# Patient Record
Sex: MALE | Race: WHITE | NOT HISPANIC OR LATINO
[De-identification: names, ages, dates, MRNs, and addresses within clinical notes are randomized per-mention and may not be internally consistent; named-entity substitution may affect disease eponyms.]

---

## 2021-01-09 ENCOUNTER — TRANSCRIPTION ENCOUNTER (OUTPATIENT)
Age: 76
End: 2021-01-09

## 2021-02-06 ENCOUNTER — TRANSCRIPTION ENCOUNTER (OUTPATIENT)
Age: 76
End: 2021-02-06

## 2022-02-11 ENCOUNTER — EMERGENCY (EMERGENCY)
Facility: HOSPITAL | Age: 77
LOS: 1 days | Discharge: HOME | End: 2022-02-11
Attending: EMERGENCY MEDICINE | Admitting: EMERGENCY MEDICINE
Payer: MEDICARE

## 2022-02-11 VITALS
WEIGHT: 175.05 LBS | DIASTOLIC BLOOD PRESSURE: 98 MMHG | TEMPERATURE: 98 F | SYSTOLIC BLOOD PRESSURE: 160 MMHG | HEIGHT: 70 IN | RESPIRATION RATE: 18 BRPM | HEART RATE: 65 BPM | OXYGEN SATURATION: 99 %

## 2022-02-11 DIAGNOSIS — R33.9 RETENTION OF URINE, UNSPECIFIED: ICD-10-CM

## 2022-02-11 DIAGNOSIS — M54.9 DORSALGIA, UNSPECIFIED: ICD-10-CM

## 2022-02-11 DIAGNOSIS — N40.1 BENIGN PROSTATIC HYPERPLASIA WITH LOWER URINARY TRACT SYMPTOMS: ICD-10-CM

## 2022-02-11 DIAGNOSIS — R33.8 OTHER RETENTION OF URINE: ICD-10-CM

## 2022-02-11 LAB
APPEARANCE UR: CLEAR — SIGNIFICANT CHANGE UP
BILIRUB UR-MCNC: NEGATIVE — SIGNIFICANT CHANGE UP
COLOR SPEC: YELLOW — SIGNIFICANT CHANGE UP
DIFF PNL FLD: NEGATIVE — SIGNIFICANT CHANGE UP
GLUCOSE UR QL: NEGATIVE — SIGNIFICANT CHANGE UP
KETONES UR-MCNC: NEGATIVE — SIGNIFICANT CHANGE UP
LEUKOCYTE ESTERASE UR-ACNC: NEGATIVE — SIGNIFICANT CHANGE UP
NITRITE UR-MCNC: NEGATIVE — SIGNIFICANT CHANGE UP
PH UR: 5.5 — SIGNIFICANT CHANGE UP (ref 5–8)
PROT UR-MCNC: NEGATIVE MG/DL — SIGNIFICANT CHANGE UP
SP GR SPEC: 1.02 — SIGNIFICANT CHANGE UP (ref 1–1.03)
UROBILINOGEN FLD QL: 0.2 E.U./DL — SIGNIFICANT CHANGE UP

## 2022-02-11 PROCEDURE — 99284 EMERGENCY DEPT VISIT MOD MDM: CPT

## 2022-02-11 NOTE — ED ADULT NURSE NOTE - NSIMPLEMENTINTERV_GEN_ALL_ED
Implemented All Universal Safety Interventions:  [place] to call system. Call bell, personal items and telephone within reach. Instruct patient to call for assistance. Room bathroom lighting operational. Non-slip footwear when patient is off stretcher. Physically safe environment: no spills, clutter or unnecessary equipment. Stretcher in lowest position, wheels locked, appropriate side rails in place.

## 2022-02-11 NOTE — ED ADULT TRIAGE NOTE - CHIEF COMPLAINT QUOTE
Pt c/o urinary retention, states that he has not been able to urinate since 12pm, also complaining of groin pain.

## 2022-02-11 NOTE — ED ADULT TRIAGE NOTE - NSTRIAGECARE_GEN_A_ER
Pt presents to er for c/o head laceration.  Pt states tripped and fell down one stair.  Pt reports hitting head with right forehead laceration.  Pt denies loc.     Face Mask

## 2022-02-12 VITALS
HEART RATE: 68 BPM | SYSTOLIC BLOOD PRESSURE: 150 MMHG | DIASTOLIC BLOOD PRESSURE: 88 MMHG | OXYGEN SATURATION: 98 % | TEMPERATURE: 98 F | RESPIRATION RATE: 16 BRPM

## 2022-02-12 LAB
ALBUMIN SERPL ELPH-MCNC: 4 G/DL — SIGNIFICANT CHANGE UP (ref 3.3–5)
ALP SERPL-CCNC: 32 U/L — LOW (ref 40–120)
ALT FLD-CCNC: 13 U/L — SIGNIFICANT CHANGE UP (ref 10–45)
ANION GAP SERPL CALC-SCNC: 12 MMOL/L — SIGNIFICANT CHANGE UP (ref 5–17)
AST SERPL-CCNC: 27 U/L — SIGNIFICANT CHANGE UP (ref 10–40)
BASOPHILS # BLD AUTO: 0.02 K/UL — SIGNIFICANT CHANGE UP (ref 0–0.2)
BASOPHILS NFR BLD AUTO: 0.3 % — SIGNIFICANT CHANGE UP (ref 0–2)
BILIRUB SERPL-MCNC: 0.3 MG/DL — SIGNIFICANT CHANGE UP (ref 0.2–1.2)
BUN SERPL-MCNC: 25 MG/DL — HIGH (ref 7–23)
CALCIUM SERPL-MCNC: 9.4 MG/DL — SIGNIFICANT CHANGE UP (ref 8.4–10.5)
CHLORIDE SERPL-SCNC: 107 MMOL/L — SIGNIFICANT CHANGE UP (ref 96–108)
CO2 SERPL-SCNC: 22 MMOL/L — SIGNIFICANT CHANGE UP (ref 22–31)
CREAT SERPL-MCNC: 1.95 MG/DL — HIGH (ref 0.5–1.3)
EOSINOPHIL # BLD AUTO: 0.1 K/UL — SIGNIFICANT CHANGE UP (ref 0–0.5)
EOSINOPHIL NFR BLD AUTO: 1.3 % — SIGNIFICANT CHANGE UP (ref 0–6)
GLUCOSE SERPL-MCNC: 104 MG/DL — HIGH (ref 70–99)
HCT VFR BLD CALC: 37.2 % — LOW (ref 39–50)
HGB BLD-MCNC: 12.3 G/DL — LOW (ref 13–17)
IMM GRANULOCYTES NFR BLD AUTO: 0.4 % — SIGNIFICANT CHANGE UP (ref 0–1.5)
LYMPHOCYTES # BLD AUTO: 0.87 K/UL — LOW (ref 1–3.3)
LYMPHOCYTES # BLD AUTO: 11.5 % — LOW (ref 13–44)
MCHC RBC-ENTMCNC: 32.2 PG — SIGNIFICANT CHANGE UP (ref 27–34)
MCHC RBC-ENTMCNC: 33.1 GM/DL — SIGNIFICANT CHANGE UP (ref 32–36)
MCV RBC AUTO: 97.4 FL — SIGNIFICANT CHANGE UP (ref 80–100)
MONOCYTES # BLD AUTO: 0.76 K/UL — SIGNIFICANT CHANGE UP (ref 0–0.9)
MONOCYTES NFR BLD AUTO: 10.1 % — SIGNIFICANT CHANGE UP (ref 2–14)
NEUTROPHILS # BLD AUTO: 5.76 K/UL — SIGNIFICANT CHANGE UP (ref 1.8–7.4)
NEUTROPHILS NFR BLD AUTO: 76.4 % — SIGNIFICANT CHANGE UP (ref 43–77)
NRBC # BLD: 0 /100 WBCS — SIGNIFICANT CHANGE UP (ref 0–0)
PLATELET # BLD AUTO: 223 K/UL — SIGNIFICANT CHANGE UP (ref 150–400)
POTASSIUM SERPL-MCNC: 4 MMOL/L — SIGNIFICANT CHANGE UP (ref 3.5–5.3)
POTASSIUM SERPL-SCNC: 4 MMOL/L — SIGNIFICANT CHANGE UP (ref 3.5–5.3)
PROT SERPL-MCNC: 6 G/DL — SIGNIFICANT CHANGE UP (ref 6–8.3)
RBC # BLD: 3.82 M/UL — LOW (ref 4.2–5.8)
RBC # FLD: 13.9 % — SIGNIFICANT CHANGE UP (ref 10.3–14.5)
SODIUM SERPL-SCNC: 141 MMOL/L — SIGNIFICANT CHANGE UP (ref 135–145)
WBC # BLD: 7.54 K/UL — SIGNIFICANT CHANGE UP (ref 3.8–10.5)
WBC # FLD AUTO: 7.54 K/UL — SIGNIFICANT CHANGE UP (ref 3.8–10.5)

## 2022-02-12 PROCEDURE — 85025 COMPLETE CBC W/AUTO DIFF WBC: CPT

## 2022-02-12 PROCEDURE — 80053 COMPREHEN METABOLIC PANEL: CPT

## 2022-02-12 PROCEDURE — 96360 HYDRATION IV INFUSION INIT: CPT

## 2022-02-12 PROCEDURE — 81003 URINALYSIS AUTO W/O SCOPE: CPT

## 2022-02-12 PROCEDURE — 36415 COLL VENOUS BLD VENIPUNCTURE: CPT

## 2022-02-12 PROCEDURE — 99283 EMERGENCY DEPT VISIT LOW MDM: CPT | Mod: 25

## 2022-02-12 RX ORDER — SODIUM CHLORIDE 9 MG/ML
1000 INJECTION INTRAMUSCULAR; INTRAVENOUS; SUBCUTANEOUS ONCE
Refills: 0 | Status: COMPLETED | OUTPATIENT
Start: 2022-02-12 | End: 2022-02-12

## 2022-02-12 RX ADMIN — SODIUM CHLORIDE 1000 MILLILITER(S): 9 INJECTION INTRAMUSCULAR; INTRAVENOUS; SUBCUTANEOUS at 01:40

## 2022-02-12 RX ADMIN — SODIUM CHLORIDE 1000 MILLILITER(S): 9 INJECTION INTRAMUSCULAR; INTRAVENOUS; SUBCUTANEOUS at 03:06

## 2022-02-12 NOTE — ED PROVIDER NOTE - OBJECTIVE STATEMENT
75 y/o M with a pmhx of BPH presents to the ED with a cc of urine retention since yesterday. Patient reports he was on vacation last week and noticed 3 days ago that he developed urinary frequency and urgency. Patient flew back to [facility] today and spoke with his PMD about symptoms and PMD sent a rx of Cifran. Patient took his first dose today. Patient is also c/o pressure and pain to suprapubic region, and pain in lower back. Patient has no hx of kidney stones, blood in urine or urinary retention in the past. Patient denies any fever, chills, diarrhea or constipation. 77 y/o M with a pmhx of BPH presents to the ED with a cc of urine retention since yesterday. Patient reports he was on vacation last week and noticed 3 days ago that he developed urinary frequency and urgency. Patient flew back to [facility] today and spoke with his PMD about symptoms and PMD sent a rx of Cipro. Patient took his first dose today. Patient is also c/o pressure and pain to suprapubic region, and pain in lower back. Patient has no hx of kidney stones, blood in urine or urinary retention in the past. Patient denies any fever, chills, diarrhea or constipation.

## 2022-02-12 NOTE — ED PROVIDER NOTE - NSFOLLOWUPINSTRUCTIONS_ED_ALL_ED_FT
Acute Urinary Retention, Male       Acute urinary retention is a condition in which a person is unable to pass urine or can only pass a little urine. This condition can happen suddenly and last for a short time. If left untreated, it can become long-term (chronic) and result in kidney damage or other serious complications.      What are the causes?    This condition may be caused by:  •Obstruction or narrowing of the tube that drains the bladder (urethra). This may be caused by surgery, problems with nearby organs, or injury to the bladder or urethra.      •Problems with the nerves in the bladder.      •Tumors in the area of the pelvis, bladder, or urethra.      •Certain medicines.      •Bladder or urinary tract infection.      •Constipation.        What increases the risk?    This condition is more likely to develop in older men. As men age, their prostate may become larger and may start to press or squeeze on the bladder or the urethra. Other chronic health conditions can increase the risk of acute urinary retention. These include:  •Diseases such as multiple sclerosis.      •Spinal cord injuries.      •Diabetes.      •Degenerative cognitive conditions, such as delirium or dementia.      •Psychological conditions. A man may hold his urine due to trauma or because he does not want to use the bathroom.        What are the signs or symptoms?    Symptoms of this condition include:  •Trouble urinating.      •Pain in the lower abdomen.        How is this diagnosed?    This condition is diagnosed based on a physical exam and your medical history. You may also have other tests, including:  •An ultrasound of the bladder or kidneys or both.      •Blood tests.      •A urine analysis.      •Additional tests may be needed, such as a CT scan, MRI, and kidney or bladder function tests.        How is this treated?    Treatment for this condition may include:  •Medicines.      •Placing a thin, sterile tube (catheter) into the bladder to drain urine out of the body. This is called an indwelling urinary catheter. After it is inserted, the catheter is held in place with a small balloon that is filled with sterile water. Urine drains from the catheter into a collection bag outside of the body.      •Behavioral therapy.      •Treatment for other conditions.      If needed, you may be treated in the hospital for kidney function problems or to manage other complications.      Follow these instructions at home:    Medicines     •Take over-the-counter and prescription medicines only as told by your health care provider. Avoid certain medicines, such as decongestants, antihistamines, and some prescription medicines. Do not take any medicine unless your health care provider approves.      •If you were prescribed an antibiotic medicine, take it as told by your health care provider. Do not stop using the antibiotic even if you start to feel better.      General instructions     • Do not use any products that contain nicotine or tobacco. These products include cigarettes, chewing tobacco, and vaping devices, such as e-cigarettes. If you need help quitting, ask your health care provider.      •Drink enough fluid to keep your urine pale yellow.      •If you have an indwelling urinary catheter, follow the instructions from your health care provider.      •Monitor any changes in your symptoms. Tell your health care provider about any changes.      •If instructed, monitor your blood pressure at home. Report changes as told by your health care provider.      •Keep all follow-up visits. This is important.        Contact a health care provider if:    •You have uncomfortable bladder contractions that you cannot control (spasms).      •You leak urine with the spasms.        Get help right away if:    •You have chills or a fever.      •You have blood in your urine.    •You have a catheter and the following happens:  •Your catheter stops draining urine.      •Your catheter falls out.          Summary    •Acute urinary retention is a condition in which a person is unable to pass urine or can only pass a little urine. If left untreated, this condition can result in kidney damage or other serious complications.      •An enlarged prostate may cause this condition. As men age, their prostate gland may become larger and may press or squeeze on the bladder or the urethra.      •Treatment for this condition may include medicines and placement of an indwelling urinary catheter.      •Monitor any changes in your symptoms. Tell your health care provider about any changes.      This information is not intended to replace advice given to you by your health care provider. Make sure you discuss any questions you have with your health care provider.                       MITCHELL CATHETER PLACEMENT AND CARE - Discharge Care           Mitchell Catheter Placement and Care    WHAT YOU NEED TO KNOW:    A Mitchell catheter is a sterile tube that is inserted into your bladder to drain urine. It is also called an indwelling urinary catheter.     DISCHARGE INSTRUCTIONS:    Seek care immediately if:   •Your catheter comes out.       •You suddenly have material that looks like sand in the tubing or drainage bag.      •No urine is draining into the bag and you have checked the system.      •You have pain in your hip, back, pelvis, or lower abdomen.      •You are confused or cannot think clearly.      Call your doctor or urologist if:   •You have a fever.      •You have bladder spasms for more than 1 day after the catheter is placed.      •You see blood in the tubing or drainage bag.      •You have a rash or itching where the catheter tube is secured to your skin.      •Urine leaks from or around the catheter, tubing, or drainage bag.      •The closed drainage system has accidently come open or apart.       •You see a layer of crystals inside the tubing.      •You have questions or concerns about your condition or care.      Care for your catheter and drainage bag: You can reduce your risk for infection and injury by caring for your catheter and drainage bag properly.  •Wash your hands often. Wash before and after you touch your catheter, tubing, or drainage bag. Use soap and water. Wear clean disposable gloves when you care for your catheter or disconnect the drainage bag. Wash your hands before you prepare or eat food.   Handwashing           •Clean your genital area 2 times every day. Clean your catheter area and anal opening after every bowel movement. ?For men: Use a soapy cloth to clean the tip of your penis. Start where the catheter enters. Wipe backward making sure to pull back the foreskin. Then use a cloth with clear water in the same direction to clean away the soap.       ?For women: Use a soapy cloth to clean the area that the catheter enters your body. Make sure to separate your labia and wipe toward the anus. Then use a cloth with clear water and wipe in the same direction.       •Secure the catheter tube so you do not pull or move the catheter. This helps prevent pain and bladder spasms. Healthcare providers will show you how to use medical tape or a strap to secure the catheter tube to your body.       •Keep a closed drainage system. Your catheter should always be attached to the drainage bag to form a closed system. Do not disconnect any part of the closed system unless you need to change the bag.      •Keep the drainage bag below the level of your waist. This helps stop urine from moving back up the tubing and into your bladder. Do not loop or kink the tubing. This can cause urine to back up and collect in your bladder. Do not let the drainage bag touch or lie on the floor.      •Empty the drainage bag when needed. The weight of a full drainage bag can be painful. Empty the drainage bag every 3 to 6 hours or when it is ? full.       •Clean and change the drainage bag as directed. Ask your healthcare provider how often you should change the drainage bag and what cleaning solution to use. Wear disposable gloves when you change the bag. Do not allow the end of the catheter or tubing to touch anything. Clean the ends with an alcohol pad before you reconnect them.      What to do if problems develop:   •No urine is draining into the bag: ?Check for kinks in the tubing and straighten them out.       ?Check the tape or strap used to secure the catheter tube to your skin. Make sure it is not blocking the tube.       ?Make sure you are not sitting or lying on the tubing.      ?Make sure the urine bag is hanging below the level of your waist.      •Urine leaks from or around the catheter, tubing, or drainage bag: Check if the closed drainage system has accidently come open or apart. Clean the catheter and tubing ends with a new alcohol pad and reconnect them.       Follow up with your doctor or urologist as directed: Write down your questions so you remember to ask them during your visits.

## 2022-02-12 NOTE — ED PROVIDER NOTE - CLINICAL SUMMARY MEDICAL DECISION MAKING FREE TEXT BOX
75 y/o M with urinary retention x1 day, as well as urinary frequency and urgency for the past x3 days. Patient is afebrile and rest of his exam is unremarkable. Will put Echols catheter, check basic labs, and call patients urologist.

## 2022-02-12 NOTE — ED PROVIDER NOTE - PATIENT PORTAL LINK FT
You can access the FollowMyHealth Patient Portal offered by [facility] by registering at the following website: [email or web address]. By joining [facility]’s FollowMyHealth portal, you will also be able to view your health information using other applications (apps) compatible with our system.

## 2022-02-12 NOTE — ED PROVIDER NOTE - CARE PROVIDER_API CALL
Joel Farias)  Urology  [address], [facility], [place] [address]  Phone: [phone]  Fax: [phone]  Follow Up Time:

## 2022-02-14 DIAGNOSIS — N49.1 INFLAMMATORY DISORDERS OF SPERMATIC CORD, TUNICA VAGINALIS AND VAS DEFERENS: ICD-10-CM

## 2022-02-14 PROBLEM — Z00.00 ENCOUNTER FOR PREVENTIVE HEALTH EXAMINATION: Status: ACTIVE | Noted: 2022-02-14

## 2022-02-14 PROBLEM — N40.0 BENIGN PROSTATIC HYPERPLASIA WITHOUT LOWER URINARY TRACT SYMPTOMS: Chronic | Status: ACTIVE | Noted: 2022-02-12

## 2022-02-15 ENCOUNTER — NON-APPOINTMENT (OUTPATIENT)
Age: 77
End: 2022-02-15

## 2022-02-16 ENCOUNTER — APPOINTMENT (OUTPATIENT)
Dept: UROLOGY | Facility: CLINIC | Age: 77
End: 2022-02-16
Payer: MEDICARE

## 2022-02-16 VITALS
HEART RATE: 61 BPM | TEMPERATURE: 98 F | BODY MASS INDEX: 25.05 KG/M2 | DIASTOLIC BLOOD PRESSURE: 80 MMHG | WEIGHT: 175 LBS | HEIGHT: 70 IN | SYSTOLIC BLOOD PRESSURE: 160 MMHG

## 2022-02-16 PROCEDURE — 51798 US URINE CAPACITY MEASURE: CPT

## 2022-02-16 PROCEDURE — 51702 INSERT TEMP BLADDER CATH: CPT

## 2022-02-16 PROCEDURE — 99204 OFFICE O/P NEW MOD 45 MIN: CPT | Mod: 25

## 2022-02-16 NOTE — PHYSICAL EXAM
[General Appearance - Well Developed] : well developed [General Appearance - Well Nourished] : well nourished [Normal Appearance] : normal appearance [Well Groomed] : well groomed [General Appearance - In No Acute Distress] : no acute distress [Edema] : no peripheral edema [Respiration, Rhythm And Depth] : normal respiratory rhythm and effort [Exaggerated Use Of Accessory Muscles For Inspiration] : no accessory muscle use [Abdomen Soft] : soft [Abdomen Tenderness] : non-tender [Urethral Meatus] : meatus normal [Penis Abnormality] : normal uncircumcised penis [Epididymis] : the epididymides were normal [Normal Station and Gait] : the gait and station were normal for the patient's age [] : no rash [No Focal Deficits] : no focal deficits [Oriented To Time, Place, And Person] : oriented to person, place, and time [Affect] : the affect was normal [Mood] : the mood was normal [Not Anxious] : not anxious [Scrotum] : the scrotum was normal

## 2022-02-17 ENCOUNTER — APPOINTMENT (OUTPATIENT)
Dept: CT IMAGING | Facility: HOSPITAL | Age: 77
End: 2022-02-17

## 2022-02-17 ENCOUNTER — OUTPATIENT (OUTPATIENT)
Dept: OUTPATIENT SERVICES | Facility: HOSPITAL | Age: 77
LOS: 1 days | End: 2022-02-17
Payer: MEDICARE

## 2022-02-17 PROCEDURE — G1004: CPT

## 2022-02-17 PROCEDURE — 74176 CT ABD & PELVIS W/O CONTRAST: CPT | Mod: 26,MG

## 2022-02-17 PROCEDURE — 74176 CT ABD & PELVIS W/O CONTRAST: CPT | Mod: MG

## 2022-02-17 NOTE — HISTORY OF PRESENT ILLNESS
[Urinary Retention] : urinary retention [Left Flank] : left flank [Right Flank] : right flank [Currently Experiencing ___] :  [unfilled] [FreeTextEntry1] : 75 y/o M with long standing BPH presents today for TOV s/p urinary retention with catheter placement at [facility] ED 5 days ago. \par Patient reports he was diagnosed with BPH 7+ years ago by previous urologist. He reports urinary urgency, frequency and  hesitancy. He has never tried medications for BPH/LUTS. This is his first episode of retention requiring catheterization. Creatinine at ED was 1.95. Patient had repeat labs with PCP 2 days ago pending results. \par Today reports feeling well. Catheter draining clear yellow urine. Endorses flank discomfort, denies hematuria\par Additionally, patient reports hx of elevated, vacillating PSA. The highest PSA he recalls is 8. He denies prior MRI, biopsy. Patient reports his previous urologist attributed PSA elevations to prostatitis.\par He denies fevers or chills, dysuria, hematuria or flank pain. \par \par \par \par \par

## 2022-02-17 NOTE — ASSESSMENT
[FreeTextEntry1] : 77 y/o M with BPH/LUTS s/p arshad catheter insertion at [facility] for 5 days for acute retention \par \par BPH with LUTS\par Failed TOV today\par Instilled 250 ml sterile water\par Voided 100 ml \par Residual: 671 ml ( see catheter insertion note)\par Labs pending with PCP 2 days ago - requesting results\par Discussed CT imaging to assess for hydronephrosis\par Continue  Tamsulosin 0.4 mg at bedtime, informed patient of medication use and s/e\par \par \par F/u 1 week for TOV/catheter removal with Dr Farias

## 2022-02-23 ENCOUNTER — APPOINTMENT (OUTPATIENT)
Dept: UROLOGY | Facility: CLINIC | Age: 77
End: 2022-02-23
Payer: MEDICARE

## 2022-02-23 VITALS — SYSTOLIC BLOOD PRESSURE: 138 MMHG | DIASTOLIC BLOOD PRESSURE: 73 MMHG | TEMPERATURE: 97.9 F | HEART RATE: 65 BPM

## 2022-02-23 PROCEDURE — 99214 OFFICE O/P EST MOD 30 MIN: CPT

## 2022-02-23 NOTE — HISTORY OF PRESENT ILLNESS
[Currently Experiencing ___] :  [unfilled] [Urinary Retention] : urinary retention [Left Flank] : left flank [Right Flank] : right flank [FreeTextEntry1] : 77 y/o M with long standing BPH presents today for TOV s/p urinary retention with catheter placement at [facility] ED 5 days ago. \par Patient reports he was diagnosed with BPH 7+ years ago by previous urologist. He reports urinary urgency, frequency and  hesitancy. He has never tried medications for BPH/LUTS. This is his first episode of retention requiring catheterization. Creatinine at ED was 1.95. Patient had repeat labs with PCP 2 days ago pending results. \par Today reports feeling well. Catheter draining clear yellow urine. Endorses flank discomfort, denies hematuria\par Additionally, patient reports hx of elevated, vacillating PSA. The highest PSA he recalls is 8. He denies prior MRI, biopsy. Patient reports his previous urologist attributed PSA elevations to prostatitis.\par He denies fevers or chills, dysuria, hematuria or flank pain. \par \par 2/23/22 Here for f/u and TOV. Reports feeling well since last encounter. Echols catheter intact, draining clear, yellow urine. Recent PSA = 13 but was performed when Echols was placed. \par \par

## 2022-02-23 NOTE — ASSESSMENT
[FreeTextEntry1] : 75 y/o M with BPH/LUTS currently with arshad catheter for acute retention \par Failed TOV today\par Patient given CIC education in detail. \par Continue  Tamsulosin 0.4 mg at bedtime\par CT scan reviewed, very enlarged prostate\par Discussed possible surgical options for BPH for glands > 100gm \par \par Elevated PSA\par Recommend MRI prostate before considering BPH options\par \par F/u based on MRI

## 2022-02-23 NOTE — PHYSICAL EXAM
[General Appearance - Well Developed] : well developed [General Appearance - Well Nourished] : well nourished [Normal Appearance] : normal appearance [Well Groomed] : well groomed [General Appearance - In No Acute Distress] : no acute distress [Abdomen Soft] : soft [Abdomen Tenderness] : non-tender [Costovertebral Angle Tenderness] : no ~M costovertebral angle tenderness [FreeTextEntry1] : Failed TOV, patient was instructed on CIC

## 2022-02-28 ENCOUNTER — NON-APPOINTMENT (OUTPATIENT)
Age: 77
End: 2022-02-28

## 2022-03-09 ENCOUNTER — OUTPATIENT (OUTPATIENT)
Dept: OUTPATIENT SERVICES | Facility: HOSPITAL | Age: 77
LOS: 1 days | End: 2022-03-09
Payer: MEDICARE

## 2022-03-09 ENCOUNTER — APPOINTMENT (OUTPATIENT)
Dept: MRI IMAGING | Facility: HOSPITAL | Age: 77
End: 2022-03-09

## 2022-03-09 PROCEDURE — 72197 MRI PELVIS W/O & W/DYE: CPT | Mod: 26,MG

## 2022-03-09 PROCEDURE — G1004: CPT

## 2022-03-09 PROCEDURE — A9585: CPT

## 2022-03-09 PROCEDURE — 72197 MRI PELVIS W/O & W/DYE: CPT | Mod: MG

## 2022-03-16 ENCOUNTER — RX RENEWAL (OUTPATIENT)
Age: 77
End: 2022-03-16

## 2022-03-17 ENCOUNTER — TRANSCRIPTION ENCOUNTER (OUTPATIENT)
Age: 77
End: 2022-03-17

## 2022-03-17 ENCOUNTER — NON-APPOINTMENT (OUTPATIENT)
Age: 77
End: 2022-03-17

## 2022-03-18 ENCOUNTER — TRANSCRIPTION ENCOUNTER (OUTPATIENT)
Age: 77
End: 2022-03-18

## 2022-03-23 ENCOUNTER — APPOINTMENT (OUTPATIENT)
Dept: UROLOGY | Facility: CLINIC | Age: 77
End: 2022-03-23
Payer: MEDICARE

## 2022-03-23 VITALS — DIASTOLIC BLOOD PRESSURE: 92 MMHG | SYSTOLIC BLOOD PRESSURE: 173 MMHG | HEART RATE: 66 BPM | TEMPERATURE: 97.2 F

## 2022-03-23 PROCEDURE — 99214 OFFICE O/P EST MOD 30 MIN: CPT

## 2022-03-23 RX ORDER — FINASTERIDE 5 MG/1
5 TABLET, FILM COATED ORAL DAILY
Qty: 30 | Refills: 5 | Status: ACTIVE | COMMUNITY
Start: 2022-03-23 | End: 1900-01-01

## 2022-03-23 RX ORDER — TADALAFIL 5 MG/1
5 TABLET ORAL
Qty: 30 | Refills: 5 | Status: ACTIVE | COMMUNITY
Start: 2022-03-23 | End: 1900-01-01

## 2022-03-24 NOTE — LETTER BODY
[Dear  ___] : Dear  [unfilled], [Courtesy Letter:] : I had the pleasure of seeing your patient, [unfilled], in my office today. [Please see my note below.] : Please see my note below. [Consult Closing:] : Thank you very much for allowing me to participate in the care of this patient.  If you have any questions, please do not hesitate to contact me. [Sincerely,] : Sincerely, [FreeTextEntry2] : Regulo Rosales MD\par [address] [facility] [address]\[place] [address] [FreeTextEntry3] : Joel Farias MD, FACS\par Urologic Oncology\par Department of Urology\par [facility]\par \par [employer] [facility] \par \par

## 2022-03-24 NOTE — HISTORY OF PRESENT ILLNESS
[Currently Experiencing ___] :  [unfilled] [Left Flank] : left flank [Right Flank] : right flank [FreeTextEntry1] : 75 y/o M with long standing BPH presents today for TOV s/p urinary retention with catheter placement at [facility] ED 5 days ago. \par Patient reports he was diagnosed with BPH 7+ years ago by previous urologist. He reports urinary urgency, frequency and  hesitancy. He has never tried medications for BPH/LUTS. This is his first episode of retention requiring catheterization. Creatinine at ED was 1.95. Patient had repeat labs with PCP 2 days ago pending results. \par Today reports feeling well. Catheter draining clear yellow urine. Endorses flank discomfort, denies hematuria\par Additionally, patient reports hx of elevated, vacillating PSA. The highest PSA he recalls is 8. He denies prior MRI, biopsy. Patient reports his previous urologist attributed PSA elevations to prostatitis.\par He denies fevers or chills, dysuria, hematuria or flank pain. \par \par 2/23/22 Here for f/u and TOV. Reports feeling well since last encounter. Echols catheter intact, draining clear, yellow urine. Recent PSA = 13 but was performed when Echols was placed. \par \par 3/23/22 Here for f/u. Doing well with CIC prn and voiding. Voiding normally during the day, caths at night for about 200cc and is able to sleep through the night. \par \par MRI 81gm prostate, PI-RADS 3 could represent prostatitis, repeat MRI recommended in 6-12 months\par  [Urinary Retention] : no urinary retention [None] : None

## 2022-03-24 NOTE — ASSESSMENT
[FreeTextEntry1] : 77 y/o M with BPH/LUTS with urinary retention\par Now voiding better, caths only at night for low volume but satisfied with continuing this \par Continue tamsulosin daily\par Reviewed MRI findings and PSA results. PSA was elevated but at time he had catheter in. \par MRI with PI-RADS 3, but could be prostatitis related to Echols. \par Considering his age and equivocal findings, he would like to observe\par Discussed other options for BPH\par He is interested in trying finasteride and Cialis daily\par Also discussed operative approaches for 80gm prostate but he would like to try medications first

## 2022-07-12 ENCOUNTER — TRANSCRIPTION ENCOUNTER (OUTPATIENT)
Age: 77
End: 2022-07-12

## 2022-07-13 ENCOUNTER — APPOINTMENT (OUTPATIENT)
Dept: UROLOGY | Facility: CLINIC | Age: 77
End: 2022-07-13

## 2022-07-13 VITALS
HEART RATE: 60 BPM | BODY MASS INDEX: 25.05 KG/M2 | DIASTOLIC BLOOD PRESSURE: 74 MMHG | SYSTOLIC BLOOD PRESSURE: 137 MMHG | WEIGHT: 175 LBS | TEMPERATURE: 97.9 F | HEIGHT: 70 IN

## 2022-07-13 DIAGNOSIS — N13.8 BENIGN PROSTATIC HYPERPLASIA WITH LOWER URINARY TRACT SYMPMS: ICD-10-CM

## 2022-07-13 DIAGNOSIS — R33.9 RETENTION OF URINE, UNSPECIFIED: ICD-10-CM

## 2022-07-13 DIAGNOSIS — N40.1 BENIGN PROSTATIC HYPERPLASIA WITH LOWER URINARY TRACT SYMPMS: ICD-10-CM

## 2022-07-13 DIAGNOSIS — R97.20 ELEVATED PROSTATE, SPECIFIC ANTIGEN [PSA]: ICD-10-CM

## 2022-07-13 PROCEDURE — 99213 OFFICE O/P EST LOW 20 MIN: CPT

## 2022-07-15 LAB — PSA SERPL-MCNC: 15 NG/ML

## 2022-07-15 NOTE — ASSESSMENT
[FreeTextEntry1] : 75 y/o M with BPH/LUTS with urinary retention\par Now voiding better, caths only prn but satisfied with continuing this \par Continue tamsulosin, Cialis, finasteride daily\par MRI with PI-RADS 3, but could be prostatitis related to Echols. Repeat MRI in 6-12 months was recommended\par Considering his age and equivocal findings, he would like to observe\par Discussed other options for BPH\par Also discussed operative approaches for 80gm prostate but he would like to continue meds for now\par \par Check PSA today\par \par F/u 6 months

## 2022-07-15 NOTE — HISTORY OF PRESENT ILLNESS
[Currently Experiencing ___] :  [unfilled] [None] : None [Left Flank] : left flank [Right Flank] : right flank [FreeTextEntry1] : 75 y/o M with long standing BPH presents today for TOV s/p urinary retention with catheter placement at [facility] 5 days ago. \par Patient reports he was diagnosed with BPH 7+ years ago by previous urologist. He reports urinary urgency, frequency and  hesitancy. He has never tried medications for BPH/LUTS. This is his first episode of retention requiring catheterization. Creatinine at ED was 1.95. Patient had repeat labs with PCP 2 days ago pending results. \par Today reports feeling well. Catheter draining clear yellow urine. Endorses flank discomfort, denies hematuria\par Additionally, patient reports hx of elevated, vacillating PSA. The highest PSA he recalls is 8. He denies prior MRI, biopsy. Patient reports his previous urologist attributed PSA elevations to prostatitis.\par He denies fevers or chills, dysuria, hematuria or flank pain. \par \par 2/23/22 Here for f/u and TOV. Reports feeling well since last encounter. Echols catheter intact, draining clear, yellow urine. Recent PSA = 13 but was performed when Echols was placed. \par \par 3/23/22 Here for f/u. Doing well with CIC prn and voiding. Voiding normally during the day, caths at night for about 200cc and is able to sleep through the night. \par \par MRI 81gm prostate, PI-RADS 3 could represent prostatitis, repeat MRI recommended in 6-12 months\par \par 7/13/22 Here for f/u. Doing well, occasional CIC prn for long trips or when uncomfortable. Overall satisfied with voiding and BPH meds. \par  [Urinary Retention] : no urinary retention

## 2022-07-15 NOTE — LETTER BODY
[Dear  ___] : Dear  [unfilled], [Courtesy Letter:] : I had the pleasure of seeing your patient, [unfilled], in my office today. [Please see my note below.] : Please see my note below. [Consult Closing:] : Thank you very much for allowing me to participate in the care of this patient.  If you have any questions, please do not hesitate to contact me. [Sincerely,] : Sincerely, [FreeTextEntry2] : Regulo Rosales MD\par [address] [facility] [address]\[place] [address] [FreeTextEntry3] : Joel Farias MD, FACS\par Urologic Oncology\par Department of Urology\par [facility]\par \par [employer] [facility] \par \par

## 2022-07-22 ENCOUNTER — TRANSCRIPTION ENCOUNTER (OUTPATIENT)
Age: 77
End: 2022-07-22

## 2022-10-11 ENCOUNTER — RX RENEWAL (OUTPATIENT)
Age: 77
End: 2022-10-11

## 2022-10-11 RX ORDER — TAMSULOSIN HYDROCHLORIDE 0.4 MG/1
0.4 CAPSULE ORAL
Qty: 90 | Refills: 2 | Status: ACTIVE | COMMUNITY
Start: 2022-02-14 | End: 1900-01-01

## 2023-01-03 ENCOUNTER — TRANSCRIPTION ENCOUNTER (OUTPATIENT)
Age: 78
End: 2023-01-03

## 2023-01-04 ENCOUNTER — APPOINTMENT (OUTPATIENT)
Dept: UROLOGY | Facility: CLINIC | Age: 78
End: 2023-01-04

## 2023-01-09 ENCOUNTER — NON-APPOINTMENT (OUTPATIENT)
Age: 78
End: 2023-01-09

## 2024-01-18 ENCOUNTER — INPATIENT (INPATIENT)
Facility: HOSPITAL | Age: 79
LOS: 0 days | Discharge: HOME | DRG: 301 | End: 2024-01-19
Attending: INTERNAL MEDICINE | Admitting: INTERNAL MEDICINE
Payer: COMMERCIAL

## 2024-01-18 VITALS
OXYGEN SATURATION: 98 % | HEART RATE: 60 BPM | DIASTOLIC BLOOD PRESSURE: 80 MMHG | SYSTOLIC BLOOD PRESSURE: 154 MMHG | HEIGHT: 70 IN | RESPIRATION RATE: 18 BRPM | TEMPERATURE: 98 F | WEIGHT: 175.05 LBS

## 2024-01-18 DIAGNOSIS — R09.89 OTHER SPECIFIED SYMPTOMS AND SIGNS INVOLVING THE CIRCULATORY AND RESPIRATORY SYSTEMS: ICD-10-CM

## 2024-01-18 DIAGNOSIS — N40.0 BENIGN PROSTATIC HYPERPLASIA WITHOUT LOWER URINARY TRACT SYMPTOMS: ICD-10-CM

## 2024-01-18 DIAGNOSIS — I82.890 ACUTE EMBOLISM AND THROMBOSIS OF OTHER SPECIFIED VEINS: ICD-10-CM

## 2024-01-18 DIAGNOSIS — Z29.9 ENCOUNTER FOR PROPHYLACTIC MEASURES, UNSPECIFIED: ICD-10-CM

## 2024-01-18 LAB
ALBUMIN SERPL ELPH-MCNC: 4.3 G/DL — SIGNIFICANT CHANGE UP (ref 3.3–5)
ALP SERPL-CCNC: 59 U/L — SIGNIFICANT CHANGE UP (ref 40–120)
ALT FLD-CCNC: SIGNIFICANT CHANGE UP U/L (ref 10–45)
ANION GAP SERPL CALC-SCNC: 11 MMOL/L — SIGNIFICANT CHANGE UP (ref 5–17)
APTT BLD: 143.9 SEC — CRITICAL HIGH (ref 24.5–35.6)
APTT BLD: 30.8 SEC — SIGNIFICANT CHANGE UP (ref 24.5–35.6)
APTT BLD: >200 SEC — CRITICAL HIGH (ref 24.5–35.6)
AST SERPL-CCNC: SIGNIFICANT CHANGE UP U/L (ref 10–40)
BASOPHILS # BLD AUTO: 0.05 K/UL — SIGNIFICANT CHANGE UP (ref 0–0.2)
BASOPHILS NFR BLD AUTO: 0.6 % — SIGNIFICANT CHANGE UP (ref 0–2)
BILIRUB SERPL-MCNC: 0.4 MG/DL — SIGNIFICANT CHANGE UP (ref 0.2–1.2)
BUN SERPL-MCNC: 14 MG/DL — SIGNIFICANT CHANGE UP (ref 7–23)
CALCIUM SERPL-MCNC: 9.4 MG/DL — SIGNIFICANT CHANGE UP (ref 8.4–10.5)
CHLORIDE SERPL-SCNC: 101 MMOL/L — SIGNIFICANT CHANGE UP (ref 96–108)
CO2 SERPL-SCNC: 22 MMOL/L — SIGNIFICANT CHANGE UP (ref 22–31)
CREAT SERPL-MCNC: 0.92 MG/DL — SIGNIFICANT CHANGE UP (ref 0.5–1.3)
EGFR: 86 ML/MIN/1.73M2 — SIGNIFICANT CHANGE UP
EOSINOPHIL # BLD AUTO: 0.16 K/UL — SIGNIFICANT CHANGE UP (ref 0–0.5)
EOSINOPHIL NFR BLD AUTO: 1.8 % — SIGNIFICANT CHANGE UP (ref 0–6)
GLUCOSE SERPL-MCNC: 97 MG/DL — SIGNIFICANT CHANGE UP (ref 70–99)
HCT VFR BLD CALC: 42.1 % — SIGNIFICANT CHANGE UP (ref 39–50)
HGB BLD-MCNC: 14 G/DL — SIGNIFICANT CHANGE UP (ref 13–17)
IMM GRANULOCYTES NFR BLD AUTO: 0.3 % — SIGNIFICANT CHANGE UP (ref 0–0.9)
INR BLD: 0.98 — SIGNIFICANT CHANGE UP (ref 0.85–1.18)
LYMPHOCYTES # BLD AUTO: 1.1 K/UL — SIGNIFICANT CHANGE UP (ref 1–3.3)
LYMPHOCYTES # BLD AUTO: 12.3 % — LOW (ref 13–44)
MCHC RBC-ENTMCNC: 32 PG — SIGNIFICANT CHANGE UP (ref 27–34)
MCHC RBC-ENTMCNC: 33.3 GM/DL — SIGNIFICANT CHANGE UP (ref 32–36)
MCV RBC AUTO: 96.3 FL — SIGNIFICANT CHANGE UP (ref 80–100)
MONOCYTES # BLD AUTO: 0.55 K/UL — SIGNIFICANT CHANGE UP (ref 0–0.9)
MONOCYTES NFR BLD AUTO: 6.2 % — SIGNIFICANT CHANGE UP (ref 2–14)
NEUTROPHILS # BLD AUTO: 7.04 K/UL — SIGNIFICANT CHANGE UP (ref 1.8–7.4)
NEUTROPHILS NFR BLD AUTO: 78.8 % — HIGH (ref 43–77)
NRBC # BLD: 0 /100 WBCS — SIGNIFICANT CHANGE UP (ref 0–0)
PLATELET # BLD AUTO: 267 K/UL — SIGNIFICANT CHANGE UP (ref 150–400)
POTASSIUM SERPL-MCNC: SIGNIFICANT CHANGE UP MMOL/L (ref 3.5–5.3)
POTASSIUM SERPL-SCNC: SIGNIFICANT CHANGE UP MMOL/L (ref 3.5–5.3)
PROT SERPL-MCNC: 7.5 G/DL — SIGNIFICANT CHANGE UP (ref 6–8.3)
PROTHROM AB SERPL-ACNC: 11.2 SEC — SIGNIFICANT CHANGE UP (ref 9.5–13)
RBC # BLD: 4.37 M/UL — SIGNIFICANT CHANGE UP (ref 4.2–5.8)
RBC # FLD: 13.2 % — SIGNIFICANT CHANGE UP (ref 10.3–14.5)
SODIUM SERPL-SCNC: 134 MMOL/L — LOW (ref 135–145)
WBC # BLD: 8.93 K/UL — SIGNIFICANT CHANGE UP (ref 3.8–10.5)
WBC # FLD AUTO: 8.93 K/UL — SIGNIFICANT CHANGE UP (ref 3.8–10.5)

## 2024-01-18 PROCEDURE — 99285 EMERGENCY DEPT VISIT HI MDM: CPT

## 2024-01-18 RX ORDER — HEPARIN SODIUM 5000 [USP'U]/ML
3000 INJECTION INTRAVENOUS; SUBCUTANEOUS EVERY 6 HOURS
Refills: 0 | Status: DISCONTINUED | OUTPATIENT
Start: 2024-01-18 | End: 2024-01-18

## 2024-01-18 RX ORDER — ONDANSETRON 8 MG/1
4 TABLET, FILM COATED ORAL EVERY 8 HOURS
Refills: 0 | Status: DISCONTINUED | OUTPATIENT
Start: 2024-01-18 | End: 2024-01-19

## 2024-01-18 RX ORDER — FINASTERIDE 5 MG/1
1 TABLET, FILM COATED ORAL
Refills: 0 | DISCHARGE

## 2024-01-18 RX ORDER — HEPARIN SODIUM 5000 [USP'U]/ML
1200 INJECTION INTRAVENOUS; SUBCUTANEOUS
Qty: 25000 | Refills: 0 | Status: DISCONTINUED | OUTPATIENT
Start: 2024-01-18 | End: 2024-01-18

## 2024-01-18 RX ORDER — HEPARIN SODIUM 5000 [USP'U]/ML
INJECTION INTRAVENOUS; SUBCUTANEOUS
Qty: 25000 | Refills: 0 | Status: DISCONTINUED | OUTPATIENT
Start: 2024-01-18 | End: 2024-01-18

## 2024-01-18 RX ORDER — HEPARIN SODIUM 5000 [USP'U]/ML
6500 INJECTION INTRAVENOUS; SUBCUTANEOUS EVERY 6 HOURS
Refills: 0 | Status: DISCONTINUED | OUTPATIENT
Start: 2024-01-18 | End: 2024-01-18

## 2024-01-18 RX ORDER — LANOLIN ALCOHOL/MO/W.PET/CERES
3 CREAM (GRAM) TOPICAL AT BEDTIME
Refills: 0 | Status: DISCONTINUED | OUTPATIENT
Start: 2024-01-18 | End: 2024-01-19

## 2024-01-18 RX ORDER — HEPARIN SODIUM 5000 [USP'U]/ML
6500 INJECTION INTRAVENOUS; SUBCUTANEOUS ONCE
Refills: 0 | Status: COMPLETED | OUTPATIENT
Start: 2024-01-18 | End: 2024-01-18

## 2024-01-18 RX ORDER — ACETAMINOPHEN 500 MG
650 TABLET ORAL EVERY 6 HOURS
Refills: 0 | Status: DISCONTINUED | OUTPATIENT
Start: 2024-01-18 | End: 2024-01-19

## 2024-01-18 RX ORDER — HEPARIN SODIUM 5000 [USP'U]/ML
1100 INJECTION INTRAVENOUS; SUBCUTANEOUS
Qty: 25000 | Refills: 0 | Status: DISCONTINUED | OUTPATIENT
Start: 2024-01-18 | End: 2024-01-19

## 2024-01-18 RX ORDER — FINASTERIDE 5 MG/1
5 TABLET, FILM COATED ORAL DAILY
Refills: 0 | Status: DISCONTINUED | OUTPATIENT
Start: 2024-01-18 | End: 2024-01-19

## 2024-01-18 RX ORDER — TADALAFIL 10 MG/1
1 TABLET, FILM COATED ORAL
Refills: 0 | DISCHARGE

## 2024-01-18 RX ADMIN — HEPARIN SODIUM 1400 UNIT(S)/HR: 5000 INJECTION INTRAVENOUS; SUBCUTANEOUS at 13:30

## 2024-01-18 RX ADMIN — HEPARIN SODIUM 6500 UNIT(S): 5000 INJECTION INTRAVENOUS; SUBCUTANEOUS at 13:30

## 2024-01-18 RX ADMIN — FINASTERIDE 5 MILLIGRAM(S): 5 TABLET, FILM COATED ORAL at 19:16

## 2024-01-18 RX ADMIN — HEPARIN SODIUM 1100 UNIT(S)/HR: 5000 INJECTION INTRAVENOUS; SUBCUTANEOUS at 21:45

## 2024-01-18 NOTE — H&P ADULT - ASSESSMENT
Patient is a 75 year old male with PMH of BPH, presenting with left internal jugular vein thrombosis, sent in by his PCP for heparinization for his blood clot prior to his prostate biopsy planned for Monday (01/22).    Patient is a 75 year old male with PMH of BPH, presenting with left internal jugular vein thrombosis, sent in by his PCP for heparinization prior to his prostate biopsy planned for Monday (01/22).

## 2024-01-18 NOTE — H&P ADULT - PROBLEM SELECTOR PLAN 2
F: As needed  E: Replete as needed  N: Regular  D:   Dispo: [facility] H/O of BPH, S/P TURP  MRI (7/2022) with PI-RADS 3. Per , pt had uptrending PSA. Had a recent MRI prostate done which showed possible prostate cancer with lymphadenopathy. Follow with Dr. Joel Brewer   Has a prostate biopsy planned for Monday (01/22)  Home meds  Finasteride 5 MG qd, Tadalafil 5 MG qd prn    Plan:  -C/w home meds

## 2024-01-18 NOTE — CONSULT NOTE ADULT - SUBJECTIVE AND OBJECTIVE BOX
Vascular Attending:  Dr. Barksdale       HPI:  Patient is a 75 year old male with PMH of BPH and prostate cancer. Patient noticed increased swelling this morning when he woke up. Wife states she noticed swelling of his neck last night. Patient states his neck is tender in the area of swelling. Pt got US and CT chest today at outpatient facility which revealed thrombosis of left IJV. Patient was recently diagnosed with prostate cancer and is scheduled for a biopsy on Monday. States his doctor instructed him to go to the ED for admission for further heparinization before his procedure. Pt not on any blood thinners/anticoagulation at home. Denies any sensory or motor deficits. Denies vision changes, dizziness, nausea, vomiting or fevers.       PAST MEDICAL & SURGICAL HISTORY:  BPH (benign prostatic hyperplasia)          REVIEW OF SYSTEMS        MEDICATIONS  (STANDING):  heparin   Injectable 6500 Unit(s) IV Push once  heparin  Infusion.  Unit(s)/Hr (14 mL/Hr) IV Continuous <Continuous>    MEDICATIONS  (PRN):  acetaminophen     Tablet .. 650 milliGRAM(s) Oral every 6 hours PRN Temp greater or equal to 38C (100.4F), Mild Pain (1 - 3)  heparin   Injectable 6500 Unit(s) IV Push every 6 hours PRN For aPTT less than 40  heparin   Injectable 3000 Unit(s) IV Push every 6 hours PRN For aPTT between 40 - 57  melatonin 3 milliGRAM(s) Oral at bedtime PRN Insomnia  ondansetron Injectable 4 milliGRAM(s) IV Push every 8 hours PRN Nausea and/or Vomiting      Allergies    No Known Allergies  VITAL SIGNS:  T(C): 36.6 (01-18-24 @ 11:49), Max: 36.6 (01-18-24 @ 11:49)  T(F): 97.8 (01-18-24 @ 11:49), Max: 97.8 (01-18-24 @ 11:49)  HR: 63 (01-18-24 @ 12:18) (60 - 63)  BP: 160/74 (01-18-24 @ 12:18) (154/80 - 160/74)  BP(mean): --  RR: 18 (01-18-24 @ 12:18) (18 - 18)  SpO2: 97% (01-18-24 @ 12:18) (97% - 98%)  Wt(kg): --    PHYSICAL EXAM:  Constitutional: resting comfortably in bed; NAD  HEENT: NC/AT, EOMI, anicteric sclera, uvula midline,  Neck: supple; swelling to left-side of neck, edema to left side of neck, erythema diffusely throughout neck   Respiratory: CTA B/L  Cardiac: RRR  Gastrointestinal: soft, NT/ND  Extremities: WWP, no clubbing or cyanosis; no peripheral edema  Neurologic: AAOx3;       LABS:                        14.0   8.93  )-----------( 267      ( 18 Jan 2024 12:24 )             42.1     01-18    134<L>  |  101  |  14  ----------------------------<  97  see note   |  22  |  0.92    Ca    9.4      18 Jan 2024 12:24    TPro  7.5  /  Alb  4.3  /  TBili  0.4  /  DBili  x   /  AST  see note  /  ALT  see note  /  AlkPhos  59  01-18    PT/INR - ( 18 Jan 2024 12:24 )   PT: 11.2 sec;   INR: 0.98          PTT - ( 18 Jan 2024 12:24 )  PTT:30.8 sec  Urinalysis Basic - ( 18 Jan 2024 12:24 )    Color: x / Appearance: x / SG: x / pH: x  Gluc: 97 mg/dL / Ketone: x  / Bili: x / Urobili: x   Blood: x / Protein: x / Nitrite: x   Leuk Esterase: x / RBC: x / WBC x   Sq Epi: x / Non Sq Epi: x / Bacteria: x        RADIOLOGY & ADDITIONAL STUDIES Vascular Attending:  Dr. Barksdale       HPI:  Patient is a 75 year old male with PMH of BPH presents for left IJV thrombosis. Patient noticed increased swelling this morning when he woke up. Wife states she noticed swelling of his neck last night. Patient states his neck is tender in the area of swelling. Pt got US and CT chest today at outpatient facility which revealed thrombosis of left IJV. Patient is recently being worked up for prostate cancer and is scheduled for a biopsy on Monday. States his doctor instructed him to go to the ED for admission for further heparinization before his procedure. Pt not on any blood thinners/anticoagulation at home. Denies any sensory or motor deficits. Denies vision changes, dizziness, nausea, vomiting or fevers.       PAST MEDICAL & SURGICAL HISTORY:  BPH (benign prostatic hyperplasia)          REVIEW OF SYSTEMS        MEDICATIONS  (STANDING):  heparin   Injectable 6500 Unit(s) IV Push once  heparin  Infusion.  Unit(s)/Hr (14 mL/Hr) IV Continuous <Continuous>    MEDICATIONS  (PRN):  acetaminophen     Tablet .. 650 milliGRAM(s) Oral every 6 hours PRN Temp greater or equal to 38C (100.4F), Mild Pain (1 - 3)  heparin   Injectable 6500 Unit(s) IV Push every 6 hours PRN For aPTT less than 40  heparin   Injectable 3000 Unit(s) IV Push every 6 hours PRN For aPTT between 40 - 57  melatonin 3 milliGRAM(s) Oral at bedtime PRN Insomnia  ondansetron Injectable 4 milliGRAM(s) IV Push every 8 hours PRN Nausea and/or Vomiting      Allergies    No Known Allergies  VITAL SIGNS:  T(C): 36.6 (01-18-24 @ 11:49), Max: 36.6 (01-18-24 @ 11:49)  T(F): 97.8 (01-18-24 @ 11:49), Max: 97.8 (01-18-24 @ 11:49)  HR: 63 (01-18-24 @ 12:18) (60 - 63)  BP: 160/74 (01-18-24 @ 12:18) (154/80 - 160/74)  BP(mean): --  RR: 18 (01-18-24 @ 12:18) (18 - 18)  SpO2: 97% (01-18-24 @ 12:18) (97% - 98%)  Wt(kg): --    PHYSICAL EXAM:  Constitutional: resting comfortably in bed; NAD  HEENT: NC/AT, EOMI, anicteric sclera, uvula midline,  Neck: supple; swelling to left-side of neck, edema to left side of neck, erythema diffusely throughout neck   Respiratory: CTA B/L  Cardiac: RRR  Gastrointestinal: soft, NT/ND  Extremities: WWP, no clubbing or cyanosis; no peripheral edema  Neurologic: AAOx3;       LABS:                        14.0   8.93  )-----------( 267      ( 18 Jan 2024 12:24 )             42.1     01-18    134<L>  |  101  |  14  ----------------------------<  97  see note   |  22  |  0.92    Ca    9.4      18 Jan 2024 12:24    TPro  7.5  /  Alb  4.3  /  TBili  0.4  /  DBili  x   /  AST  see note  /  ALT  see note  /  AlkPhos  59  01-18    PT/INR - ( 18 Jan 2024 12:24 )   PT: 11.2 sec;   INR: 0.98          PTT - ( 18 Jan 2024 12:24 )  PTT:30.8 sec  Urinalysis Basic - ( 18 Jan 2024 12:24 )    Color: x / Appearance: x / SG: x / pH: x  Gluc: 97 mg/dL / Ketone: x  / Bili: x / Urobili: x   Blood: x / Protein: x / Nitrite: x   Leuk Esterase: x / RBC: x / WBC x   Sq Epi: x / Non Sq Epi: x / Bacteria: x        RADIOLOGY & ADDITIONAL STUDIES Vascular Attending:  Dr. Barksdale       HPI:  Patient is a 75 year old male with PMH of BPH presents for left IJV thrombosis. Patient noticed increased swelling this morning when he woke up. Wife states she noticed swelling of his neck last night. Patient states his neck is tender in the area of swelling. Pt got US and CT chest today at outpatient facility which revealed thrombosis of left IJV. Patient is recently being worked up for prostate cancer and is scheduled for a biopsy on Monday. States his doctor instructed him to go to the ED for admission for further heparinization before his procedure. Pt not on any blood thinners/anticoagulation at home. Denies any sensory or motor deficits. Denies vision changes, dizziness, nausea, vomiting or fevers.       PAST MEDICAL & SURGICAL HISTORY:  BPH (benign prostatic hyperplasia)        MEDICATIONS  (STANDING):  heparin   Injectable 6500 Unit(s) IV Push once  heparin  Infusion.  Unit(s)/Hr (14 mL/Hr) IV Continuous <Continuous>    MEDICATIONS  (PRN):  acetaminophen     Tablet .. 650 milliGRAM(s) Oral every 6 hours PRN Temp greater or equal to 38C (100.4F), Mild Pain (1 - 3)  heparin   Injectable 6500 Unit(s) IV Push every 6 hours PRN For aPTT less than 40  heparin   Injectable 3000 Unit(s) IV Push every 6 hours PRN For aPTT between 40 - 57  melatonin 3 milliGRAM(s) Oral at bedtime PRN Insomnia  ondansetron Injectable 4 milliGRAM(s) IV Push every 8 hours PRN Nausea and/or Vomiting      Allergies    No Known Allergies  VITAL SIGNS:  T(C): 36.6 (01-18-24 @ 11:49), Max: 36.6 (01-18-24 @ 11:49)  T(F): 97.8 (01-18-24 @ 11:49), Max: 97.8 (01-18-24 @ 11:49)  HR: 63 (01-18-24 @ 12:18) (60 - 63)  BP: 160/74 (01-18-24 @ 12:18) (154/80 - 160/74)  BP(mean): --  RR: 18 (01-18-24 @ 12:18) (18 - 18)  SpO2: 97% (01-18-24 @ 12:18) (97% - 98%)  Wt(kg): --    PHYSICAL EXAM:  Constitutional: resting comfortably in bed; NAD  HEENT: NC/AT, EOMI, anicteric sclera, uvula midline,  Neck: supple; swelling to left-side of neck, edema to left side of neck, erythema diffusely throughout neck   Respiratory: CTA B/L  Cardiac: RRR  Gastrointestinal: soft, NT/ND  Extremities: WWP, no clubbing or cyanosis; no peripheral edema  Neurologic: AAOx3;       LABS:                        14.0   8.93  )-----------( 267      ( 18 Jan 2024 12:24 )             42.1     01-18    134<L>  |  101  |  14  ----------------------------<  97  see note   |  22  |  0.92    Ca    9.4      18 Jan 2024 12:24    TPro  7.5  /  Alb  4.3  /  TBili  0.4  /  DBili  x   /  AST  see note  /  ALT  see note  /  AlkPhos  59  01-18    PT/INR - ( 18 Jan 2024 12:24 )   PT: 11.2 sec;   INR: 0.98          PTT - ( 18 Jan 2024 12:24 )  PTT:30.8 sec  Urinalysis Basic - ( 18 Jan 2024 12:24 )    Color: x / Appearance: x / SG: x / pH: x  Gluc: 97 mg/dL / Ketone: x  / Bili: x / Urobili: x   Blood: x / Protein: x / Nitrite: x   Leuk Esterase: x / RBC: x / WBC x   Sq Epi: x / Non Sq Epi: x / Bacteria: x        RADIOLOGY & ADDITIONAL STUDIES

## 2024-01-18 NOTE — ED ADULT NURSE REASSESSMENT NOTE - NS ED NURSE REASSESS COMMENT FT1
Heparin bolus and infusion started using full anticoagulation titration nomogram, verified with 2nd RN [place], 2 PIV's placed prior to infusion. Pt AAOx4, speaking in full and clear sentences, NAD at this time. Continuous cardiac/pulse oximetry monitoring remains in place.

## 2024-01-18 NOTE — H&P ADULT - NSHPPHYSICALEXAM_GEN_ALL_CORE
.  VITAL SIGNS:  T(C): 36.6 (01-18-24 @ 11:49), Max: 36.6 (01-18-24 @ 11:49)  T(F): 97.8 (01-18-24 @ 11:49), Max: 97.8 (01-18-24 @ 11:49)  HR: 63 (01-18-24 @ 12:18) (60 - 63)  BP: 160/74 (01-18-24 @ 12:18) (154/80 - 160/74)  BP(mean): --  RR: 18 (01-18-24 @ 12:18) (18 - 18)  SpO2: 97% (01-18-24 @ 12:18) (97% - 98%)  Wt(kg): --    PHYSICAL EXAM:    Constitutional: WDWN resting comfortably in bed; NAD  Head: NC/AT  Eyes: PERRL, EOMI, anicteric sclera  ENT: no nasal discharge; uvula midline, no oropharyngeal erythema or exudates; MMM  Neck: supple; no JVD or thyromegaly  Respiratory: CTA B/L; no W/R/R, no retractions  Cardiac: +S1/S2; RRR; no M/R/G; PMI non-displaced  Gastrointestinal: abdomen soft, NT/ND; no rebound or guarding; +BSx4  Back: spine midline, no bony tenderness or step-offs; no CVAT B/L  Extremities: WWP, no clubbing or cyanosis; no peripheral edema  Musculoskeletal: NROM x4; no joint swelling, tenderness or erythema  Vascular: 2+ radial, femoral, DP/PT pulses B/L  Dermatologic: skin warm, dry and intact; no rashes, wounds, or scars  Lymphatic: no submandibular or cervical LAD  Neurologic: AAOx3; CNII-XII grossly intact; no focal deficits  Psychiatric: affect and characteristics of appearance, verbalizations, behaviors are appropriate .  VITAL SIGNS:  T(C): 36.6 (01-18-24 @ 11:49), Max: 36.6 (01-18-24 @ 11:49)  T(F): 97.8 (01-18-24 @ 11:49), Max: 97.8 (01-18-24 @ 11:49)  HR: 63 (01-18-24 @ 12:18) (60 - 63)  BP: 160/74 (01-18-24 @ 12:18) (154/80 - 160/74)  BP(mean): --  RR: 18 (01-18-24 @ 12:18) (18 - 18)  SpO2: 97% (01-18-24 @ 12:18) (97% - 98%)  Wt(kg): --    PHYSICAL EXAM:    Constitutional: WDWN resting comfortably in bed; NAD  Head: +flushed face  Eyes: PERRL, EOMI, anicteric sclera  ENT: no nasal discharge; uvula midline, no oropharyngeal erythema or exudates; MMM  Neck: supple; +left sided swelling with prominent jugular- mild TTP  Respiratory: CTA B/L;   Cardiac: +S1/S2; RRR;  Gastrointestinal: abdomen soft, NT/ND; no rebound or guarding; +BSx4  Back: spine midline, no bony tenderness or step-offs; no CVAT B/L  Extremities: WWP, no clubbing or cyanosis; trace peripheral edema BL  Vascular: 2+ radial, femoral, DP/PT pulses B/L  Dermatologic: skin warm, dry and intact; no rashes, wounds, or scars  Neurologic: AAOx3; no focal deficits  Psychiatric: affect and characteristics of appearance, verbalizations, behaviors are appropriate

## 2024-01-18 NOTE — CONSULT NOTE ADULT - ASSESSMENT
75 year old male with PMH of BPH with 1 day of neck swelling. Outpatient ultrasound this morning showed left IJV thrombosis. Pt scheduled for biopsy on Monday for suspected prostate cancer and sent in for optimal heparinization before procedure.     Plan:   - Continue therapeutic anticoagulation per primary team  - No acute vascular intervention indicated at this time  - Please obtain outpatient CT/imaging records.  75 year old male with PMH of BPH with 1 day of neck swelling. Outpatient ultrasound this morning showed left IJV thrombosis. Pt scheduled for biopsy on Monday for suspected prostate cancer and sent in for optimal heparinization before procedure.     Plan:   - Continue therapeutic anticoagulation per primary team  - No acute vascular intervention indicated at this time  - Please obtain outpatient CT/imaging records.   - Vascular surgery will sign off. Please re-consult with questions.    Plan d/w chief resident

## 2024-01-18 NOTE — ED ADULT NURSE NOTE - OBJECTIVE STATEMENT
78y M presents to ED c/o L neck pain/clot. Endorses waking up with L neck pain, tenderness, and sensation of "puffiness," this morning. WEnt to PCP MD Cotton, sent to imaging center and referred to [facility] ED for confirmed clot to L interior jugular vein. Denies HA, vision changes, dizziness, numbness/tingling, CP/SOB, other acute sx at this time. Pt AAOx4, speaking in full and clear sentences, NAD at this time. Continuous cardiac/pulse oximetry monitoring initiated.

## 2024-01-18 NOTE — H&P ADULT - PROBLEM SELECTOR PLAN 1
H/O of BPH.   MRI (7/2022) with PI-RADS 3, but could be prostatitis related to [place]. Repeat MRI in 6-12 months was recommended  Home meds  Finasteride 5 MG qd, Tadalafil 5 MG qd, Tamsulosin HCl - 0.4 MG qd    Plan:  C/w home meds Pt with left sided swollen and tender neck, prominent jugular vein  Outpatient CT and U/S showed a blot clot in his left jugular vein per     Plan:  -Start Heparin drip for 24 hrs and then transition to Lovenox until before his procedure on Monday.   -Obtain record of scans done outpatient.  -Vascular consulted, f/u recs

## 2024-01-18 NOTE — H&P ADULT - HISTORY OF PRESENT ILLNESS
Patient is a 75 year old male with PMH of BPH and     In the ED:  Initial vital signs: T: 97.8 , HR: 60, BP:154/80, R:18 , SpO2: 98% on RA  ED course:   Labs: significant for wbc wnl with 78.8 neutrophil %, bmp wnl   Imaging:  EKG: Pending  Medications: Heparin drip  Patient is a 75 year old male with PMH of BPH, presenting with left internal jugular vein blood clot. Pt states that he woke up this morning with left sided neck tenderness and swelling and went to  who advised him to get a CT chest and LUE U/S. Reports he was found to have a left IJV thrombosis and was told by  to go to the ER for heparinization before his procedure. Per , patient had a TURP procedure in 2023 and had a follow up MRI last week at his Urologist office which showed possible prostate cancer with mets. Patient has a prostate biopsy planned for Monday 01/22/23. Currently endorsing some left sided tenderness but no chest pain, sob, dysphagia, headache, upper extremity pain, sensory/motor changes, fever, chills, n/v/d. Pt denies any prior history of blood clots or being on any blood thinners, no family of clots or hematological disorders.       In the ED:  Initial vital signs: T: 97.8 , HR: 60, BP:154/80, R:18 , SpO2: 98% on RA  ED course:   Labs: significant for wbc wnl with 78.8 neutrophil %, bmp wnl   Imaging:  EKG: Pending  Medications: Heparin drip  Patient is a 75 year old male with PMH of BPH, presenting with left internal jugular vein blood clot. Pt states that he woke up this morning with left sided neck tenderness and swelling and went to  who advised him to get a CT chest and LUE U/S. Reports he was found to have a left IJV thrombosis and was told to go to the ER for heparinization before his prostate biopsy planned for monday (01/22). Per , patient had a TURP procedure in 2023 and had a follow up MRI last week at his Urologist office which showed possible prostate cancer with mets.  Currently endorsing some left sided neck tenderness on palpation but no chest pain, sob, dysphagia, headache, upper extremity pain, sensory/motor changes, fever, chills, n/v/d. Pt denies any prior history of blood clots or being on any blood thinners, no family of clots or hematological disorders.       In the ED:  Initial vital signs: T: 97.8 , HR: 60, BP:154/80, R:18 , SpO2: 98% on RA  ED course:   Labs: significant for wbc wnl with 78.8 neutrophil %, bmp wnl   Imaging:  EKG: Pending  Medications: Heparin drip

## 2024-01-18 NOTE — ED ADULT NURSE NOTE - CHIEF COMPLAINT QUOTE
"I have a confirmed blood clot in my interior jugular vein". woke up this morning with L sided neck swelling / tenderness. Outpatient CT confirmed clot. denies chest pain, numbness, tingling, sob.

## 2024-01-18 NOTE — ED ADULT NURSE NOTE - NSFALLUNIVINTERV_ED_ALL_ED
Bed/Stretcher in lowest position, wheels locked, appropriate side rails in place/Call bell, personal items and telephone in reach/Instruct patient to call for assistance before getting out of bed/chair/stretcher/Non-slip footwear applied when patient is off stretcher/[place] to call system/Physically safe environment - no spills, clutter or unnecessary equipment/Purposeful proactive rounding/Room/bathroom lighting operational, light cord in reach

## 2024-01-18 NOTE — ED PROVIDER NOTE - CLINICAL SUMMARY MEDICAL DECISION MAKING FREE TEXT BOX
patient with 1 day of left neck discomfort found to have left IJ thrombosis as per Dr. Cotton on outpatient CT, sent in for admission for heparinization, case was discussed by Dr. Cotton with Dr. Barksdale,  patient has suspected prostate cancer, this is likely cause of patient's hypercoagulability   I notified the vascular team who will also see patient, discussed with medicine team, will admit

## 2024-01-18 NOTE — H&P ADULT - NSHPLABSRESULTS_GEN_ALL_CORE
14.0   8.93  )-----------( 267      ( 18 Jan 2024 12:24 )             42.1       01-18    x   |  x   |  14  ----------------------------<  97  x    |  22  |  0.92    Ca    9.4      18 Jan 2024 12:24                Urinalysis Basic - ( 18 Jan 2024 12:24 )    Color: x / Appearance: x / SG: x / pH: x  Gluc: 97 mg/dL / Ketone: x  / Bili: x / Urobili: x   Blood: x / Protein: x / Nitrite: x   Leuk Esterase: x / RBC: x / WBC x   Sq Epi: x / Non Sq Epi: x / Bacteria: x        PT/INR - ( 18 Jan 2024 12:24 )   PT: 11.2 sec;   INR: 0.98          PTT - ( 18 Jan 2024 12:24 )  PTT:30.8 sec    Lactate Trend            CAPILLARY BLOOD GLUCOSE

## 2024-01-18 NOTE — ED PROVIDER NOTE - OBJECTIVE STATEMENT
78-year-old with elevated PSA and enlarged prostate suspicious for prostate cancer with plans for biopsy next Monday, patient today awoke with the sensation of neck fullness and achiness and went to his primary doctor Dr. Cotton who ordered CT imaging and an ultrasound of his left upper extremity,  patient denies chest pain no shortness of breath no dizziness, no headache, on outpatient imaging had 100% occlusion of left IJ,

## 2024-01-18 NOTE — ED ADULT TRIAGE NOTE - CHIEF COMPLAINT QUOTE
"I have a confirmed blood clot in my interior jugular vein". woke up this morning with L sided neck swelling / tenderness. Outpatient CT confirmed clot. "I have a confirmed blood clot in my interior jugular vein". woke up this morning with L sided neck swelling / tenderness. Outpatient CT confirmed clot. denies chest pain, numbness, tingling, sob.

## 2024-01-18 NOTE — ED PROVIDER NOTE - ENMT, MLM
Airway patent, Nasal mucosa clear. Mouth with normal mucosa. Throat has no vesicles, no oropharyngeal exudates and uvula is midline.  + mild tenderness L side of neck / no rash

## 2024-01-19 ENCOUNTER — TRANSCRIPTION ENCOUNTER (OUTPATIENT)
Age: 79
End: 2024-01-19

## 2024-01-19 VITALS
SYSTOLIC BLOOD PRESSURE: 118 MMHG | TEMPERATURE: 98 F | RESPIRATION RATE: 17 BRPM | HEART RATE: 57 BPM | DIASTOLIC BLOOD PRESSURE: 70 MMHG | OXYGEN SATURATION: 97 %

## 2024-01-19 LAB
ALBUMIN SERPL ELPH-MCNC: 3.7 G/DL — SIGNIFICANT CHANGE UP (ref 3.3–5)
ALP SERPL-CCNC: 53 U/L — SIGNIFICANT CHANGE UP (ref 40–120)
ALT FLD-CCNC: 9 U/L — LOW (ref 10–45)
ANION GAP SERPL CALC-SCNC: 10 MMOL/L — SIGNIFICANT CHANGE UP (ref 5–17)
APTT BLD: 146.6 SEC — CRITICAL HIGH (ref 24.5–35.6)
APTT BLD: 43.7 SEC — HIGH (ref 24.5–35.6)
AST SERPL-CCNC: 13 U/L — SIGNIFICANT CHANGE UP (ref 10–40)
BASOPHILS # BLD AUTO: 0.03 K/UL — SIGNIFICANT CHANGE UP (ref 0–0.2)
BASOPHILS NFR BLD AUTO: 0.4 % — SIGNIFICANT CHANGE UP (ref 0–2)
BILIRUB SERPL-MCNC: 0.3 MG/DL — SIGNIFICANT CHANGE UP (ref 0.2–1.2)
BUN SERPL-MCNC: 13 MG/DL — SIGNIFICANT CHANGE UP (ref 7–23)
CALCIUM SERPL-MCNC: 9 MG/DL — SIGNIFICANT CHANGE UP (ref 8.4–10.5)
CHLORIDE SERPL-SCNC: 102 MMOL/L — SIGNIFICANT CHANGE UP (ref 96–108)
CO2 SERPL-SCNC: 26 MMOL/L — SIGNIFICANT CHANGE UP (ref 22–31)
CREAT SERPL-MCNC: 0.95 MG/DL — SIGNIFICANT CHANGE UP (ref 0.5–1.3)
EGFR: 82 ML/MIN/1.73M2 — SIGNIFICANT CHANGE UP
EOSINOPHIL # BLD AUTO: 0.26 K/UL — SIGNIFICANT CHANGE UP (ref 0–0.5)
EOSINOPHIL NFR BLD AUTO: 3.4 % — SIGNIFICANT CHANGE UP (ref 0–6)
GLUCOSE SERPL-MCNC: 101 MG/DL — HIGH (ref 70–99)
HCT VFR BLD CALC: 38.9 % — LOW (ref 39–50)
HCV AB S/CO SERPL IA: 0.03 S/CO — SIGNIFICANT CHANGE UP
HCV AB SERPL-IMP: SIGNIFICANT CHANGE UP
HGB BLD-MCNC: 12.6 G/DL — LOW (ref 13–17)
IMM GRANULOCYTES NFR BLD AUTO: 0.4 % — SIGNIFICANT CHANGE UP (ref 0–0.9)
INR BLD: 1.01 — SIGNIFICANT CHANGE UP (ref 0.85–1.18)
LYMPHOCYTES # BLD AUTO: 0.99 K/UL — LOW (ref 1–3.3)
LYMPHOCYTES # BLD AUTO: 12.9 % — LOW (ref 13–44)
MAGNESIUM SERPL-MCNC: 1.9 MG/DL — SIGNIFICANT CHANGE UP (ref 1.6–2.6)
MCHC RBC-ENTMCNC: 31.6 PG — SIGNIFICANT CHANGE UP (ref 27–34)
MCHC RBC-ENTMCNC: 32.4 GM/DL — SIGNIFICANT CHANGE UP (ref 32–36)
MCV RBC AUTO: 97.5 FL — SIGNIFICANT CHANGE UP (ref 80–100)
MONOCYTES # BLD AUTO: 0.72 K/UL — SIGNIFICANT CHANGE UP (ref 0–0.9)
MONOCYTES NFR BLD AUTO: 9.4 % — SIGNIFICANT CHANGE UP (ref 2–14)
NEUTROPHILS # BLD AUTO: 5.67 K/UL — SIGNIFICANT CHANGE UP (ref 1.8–7.4)
NEUTROPHILS NFR BLD AUTO: 73.5 % — SIGNIFICANT CHANGE UP (ref 43–77)
NRBC # BLD: 0 /100 WBCS — SIGNIFICANT CHANGE UP (ref 0–0)
PHOSPHATE SERPL-MCNC: 2.9 MG/DL — SIGNIFICANT CHANGE UP (ref 2.5–4.5)
PLATELET # BLD AUTO: 246 K/UL — SIGNIFICANT CHANGE UP (ref 150–400)
POTASSIUM SERPL-MCNC: 4 MMOL/L — SIGNIFICANT CHANGE UP (ref 3.5–5.3)
POTASSIUM SERPL-SCNC: 4 MMOL/L — SIGNIFICANT CHANGE UP (ref 3.5–5.3)
PROT SERPL-MCNC: 6.7 G/DL — SIGNIFICANT CHANGE UP (ref 6–8.3)
PROTHROM AB SERPL-ACNC: 11.5 SEC — SIGNIFICANT CHANGE UP (ref 9.5–13)
RBC # BLD: 3.99 M/UL — LOW (ref 4.2–5.8)
RBC # FLD: 13.2 % — SIGNIFICANT CHANGE UP (ref 10.3–14.5)
SODIUM SERPL-SCNC: 138 MMOL/L — SIGNIFICANT CHANGE UP (ref 135–145)
WBC # BLD: 7.7 K/UL — SIGNIFICANT CHANGE UP (ref 3.8–10.5)
WBC # FLD AUTO: 7.7 K/UL — SIGNIFICANT CHANGE UP (ref 3.8–10.5)

## 2024-01-19 PROCEDURE — 99285 EMERGENCY DEPT VISIT HI MDM: CPT

## 2024-01-19 PROCEDURE — 85025 COMPLETE CBC W/AUTO DIFF WBC: CPT

## 2024-01-19 PROCEDURE — 80053 COMPREHEN METABOLIC PANEL: CPT

## 2024-01-19 PROCEDURE — 83735 ASSAY OF MAGNESIUM: CPT

## 2024-01-19 PROCEDURE — 84100 ASSAY OF PHOSPHORUS: CPT

## 2024-01-19 PROCEDURE — 85610 PROTHROMBIN TIME: CPT

## 2024-01-19 PROCEDURE — 96374 THER/PROPH/DIAG INJ IV PUSH: CPT

## 2024-01-19 PROCEDURE — 86803 HEPATITIS C AB TEST: CPT

## 2024-01-19 PROCEDURE — 85730 THROMBOPLASTIN TIME PARTIAL: CPT

## 2024-01-19 PROCEDURE — 36415 COLL VENOUS BLD VENIPUNCTURE: CPT

## 2024-01-19 RX ORDER — ENOXAPARIN SODIUM 100 MG/ML
80 INJECTION SUBCUTANEOUS
Qty: 5 | Refills: 0
Start: 2024-01-19 | End: 2024-01-21

## 2024-01-19 RX ORDER — HEPARIN SODIUM 5000 [USP'U]/ML
800 INJECTION INTRAVENOUS; SUBCUTANEOUS
Qty: 25000 | Refills: 0 | Status: DISCONTINUED | OUTPATIENT
Start: 2024-01-19 | End: 2024-01-19

## 2024-01-19 RX ORDER — ENOXAPARIN SODIUM 100 MG/ML
80 INJECTION SUBCUTANEOUS EVERY 12 HOURS
Refills: 0 | Status: DISCONTINUED | OUTPATIENT
Start: 2024-01-19 | End: 2024-01-19

## 2024-01-19 RX ADMIN — FINASTERIDE 5 MILLIGRAM(S): 5 TABLET, FILM COATED ORAL at 11:11

## 2024-01-19 RX ADMIN — HEPARIN SODIUM 800 UNIT(S)/HR: 5000 INJECTION INTRAVENOUS; SUBCUTANEOUS at 06:44

## 2024-01-19 RX ADMIN — ENOXAPARIN SODIUM 80 MILLIGRAM(S): 100 INJECTION SUBCUTANEOUS at 09:06

## 2024-01-19 NOTE — DISCHARGE NOTE PROVIDER - NSDCMRMEDTOKEN_GEN_ALL_CORE_FT
finasteride 5 mg oral tablet: 1 tab(s) orally once a day  tadalafil 5 mg oral tablet: 1 tab(s) orally once a day as needed for ED   enoxaparin 80 mg/0.8 mL injectable solution: 80 milligram(s) subcutaneously 2 times a day Last dose 1/21/2024 before bed  finasteride 5 mg oral tablet: 1 tab(s) orally once a day  tadalafil 5 mg oral tablet: 1 tab(s) orally once a day as needed for ED

## 2024-01-19 NOTE — PROGRESS NOTE ADULT - ASSESSMENT
Reviewed plan for lovenox until the am of prostate biopsy next week  Then switch to eliquis post biopsy  Reviewed bleeding warnings in detail
Patient is a 75 year old male with PMH of BPH, presenting with left internal jugular vein thrombosis, sent in by his PCP for heparinization prior to his prostate biopsy planned for Monday (01/22).

## 2024-01-19 NOTE — DISCHARGE NOTE NURSING/CASE MANAGEMENT/SOCIAL WORK - PATIENT PORTAL LINK FT
You can access the FollowMyHealth Patient Portal offered by [facility] by registering at the following website: [email or web address]. By joining [facility]’s FollowMyHealth portal, you will also be able to view your health information using other applications (apps) compatible with our system.

## 2024-01-19 NOTE — PROGRESS NOTE ADULT - PROBLEM SELECTOR PLAN 1
Pt with left sided swollen and tender neck, prominent jugular vein  Outpatient CT and U/S showed a blot clot in his left jugular vein per     Plan:  -Start Heparin drip for 24 hrs and then transition to Lovenox until before his procedure on Monday.   -Obtain record of scans done outpatient.  -Vascular consulted, f/u recs

## 2024-01-19 NOTE — PROGRESS NOTE ADULT - SUBJECTIVE AND OBJECTIVE BOX
Less tenderness this am VS stable Afeb Exam stable
OVERNIGHT EVENTS:    SUBJECTIVE / INTERVAL HPI: Patient seen and examined at bedside.     VITAL SIGNS:  Vital Signs Last 24 Hrs  T(C): 36.5 (19 Jan 2024 05:45), Max: 36.7 (18 Jan 2024 14:17)  T(F): 97.7 (19 Jan 2024 05:45), Max: 98 (18 Jan 2024 14:17)  HR: 57 (19 Jan 2024 05:45) (57 - 63)  BP: 118/70 (19 Jan 2024 05:45) (118/70 - 161/72)  BP(mean): --  RR: 17 (19 Jan 2024 05:45) (16 - 18)  SpO2: 97% (19 Jan 2024 05:45) (97% - 99%)    Parameters below as of 19 Jan 2024 05:45  Patient On (Oxygen Delivery Method): room air        PHYSICAL EXAM:    General: NAD  HEENT: NC/AT; PERRL, anicteric sclera; MMM  Neck: supple w/o palpable nodularity  Cardiovascular: +S1/S2; RRR  Respiratory: CTA B/L; no W/R/R  Gastrointestinal: soft, NT/ND; +BSx4  Extremities: WWP; no edema, clubbing or cyanosis  Vascular: 2+ radial, DP/PT pulses B/L  Neurological: AAOx3; no focal deficits    MEDICATIONS:  MEDICATIONS  (STANDING):  finasteride 5 milliGRAM(s) Oral daily  heparin  Infusion. 800 Unit(s)/Hr (8 mL/Hr) IV Continuous <Continuous>    MEDICATIONS  (PRN):  acetaminophen     Tablet .. 650 milliGRAM(s) Oral every 6 hours PRN Temp greater or equal to 38C (100.4F), Mild Pain (1 - 3)  melatonin 3 milliGRAM(s) Oral at bedtime PRN Insomnia  ondansetron Injectable 4 milliGRAM(s) IV Push every 8 hours PRN Nausea and/or Vomiting      ALLERGIES:  Allergies    No Known Allergies    Intolerances        LABS:                        14.0   8.93  )-----------( 267      ( 18 Jan 2024 12:24 )             42.1     01-18    134<L>  |  101  |  14  ----------------------------<  97  see note   |  22  |  0.92    Ca    9.4      18 Jan 2024 12:24    TPro  7.5  /  Alb  4.3  /  TBili  0.4  /  DBili  x   /  AST  see note  /  ALT  see note  /  AlkPhos  59  01-18    PT/INR - ( 18 Jan 2024 12:24 )   PT: 11.2 sec;   INR: 0.98          PTT - ( 19 Jan 2024 03:14 )  PTT:146.6 sec  Urinalysis Basic - ( 18 Jan 2024 12:24 )    Color: x / Appearance: x / SG: x / pH: x  Gluc: 97 mg/dL / Ketone: x  / Bili: x / Urobili: x   Blood: x / Protein: x / Nitrite: x   Leuk Esterase: x / RBC: x / WBC x   Sq Epi: x / Non Sq Epi: x / Bacteria: x      CAPILLARY BLOOD GLUCOSE          RADIOLOGY & ADDITIONAL TESTS: Reviewed.

## 2024-01-19 NOTE — DISCHARGE NOTE PROVIDER - NSDCCPCAREPLAN_GEN_ALL_CORE_FT
PRINCIPAL DISCHARGE DIAGNOSIS  Diagnosis: Left jugular vein thrombosis  Assessment and Plan of Treatment: You were admitted to the hospital for a blood clot within your left internal jugular vein. Blood clots occur for many reasons and can occur in numerous parts of the body. Usually blood clots occur in the legs and are due to immobility, often presenting after being stationary for an extended period of time (eg, after long plane trip, prolonged bedstay). Another important cause of blood clots is due to hypercoagulability, meaning medical conditions that make one prone to blood clots. This can include a deficiency in clot-dissolving factors (eg, Factor IV Leiden) or a systemic process interfering the clotting process (eg, malignancy).  The reason you had the blood clott in the first place still requires workup, which you will undergo in the outpatient setting. We managed your clott with a blood thinner called Heparin, which we switched to Lovenox. You will be taking Lovenox 80mg twice a day up to your procedure and switched to an oral blood thinner called Eliquis after your procedure. Please take your last dose of Lovenox before bed on Sunday 1/21/2024, do NOT take the morning of your procedure. Please seek immediate medical attention if you experience increased bleeding or bruising.

## 2024-01-19 NOTE — DISCHARGE NOTE PROVIDER - CARE PROVIDER_API CALL
Kishor Cotton  Internal Medicine  [address] [place] [id number]  Phone: [phone]  Fax: [phone]  Established Patient  Follow Up Time: 1 week

## 2024-01-19 NOTE — DISCHARGE NOTE NURSING/CASE MANAGEMENT/SOCIAL WORK - NSDCPEFALRISK_GEN_ALL_CORE
For information on Fall & Injury Prevention, visit: [email or web address] OR  [email or web address] OR  https://www.cdc.gov/steadi/patient.html

## 2024-01-19 NOTE — PROGRESS NOTE ADULT - PROBLEM SELECTOR PLAN 3
F: As needed  E: Replete as needed  N: Regular  D: Heparin drip   Dispo: [facility]

## 2024-01-19 NOTE — DISCHARGE NOTE PROVIDER - HOSPITAL COURSE
#Discharge: do not delete    CELI FERNANDEZ is a 78y Male with a past medical history of _____    Presented with _____, found to have _____    Problem List/Main Diagnoses (system-based):   #     #     #    Inpatient treatment course:     Patient was discharged to: (home/[facility]/acute rehab/hospice, etc. and w/ home health/home PT/RN/home O2)    New medications:   Changes to old medications:  Medications that were stopped:    Items to follow up as outpatient:    Physical exam at the time of discharge:       LABS & STUDIES:   #Discharge: do not delete    CELI FERNANDEZ is a 78y Male with a PMH of BPH, presenting with left internal jugular vein thrombosis, sent in by his PCP for heparinization for his blood clot prior to his prostate biopsy planned for Monday (01/22). Vascular surgery consulted, no acute intervention indicated. Started on heparin GTT, switched to therapeutic lovenox. Medically optimized for DC on lovenox with close follow up of resolution of thrombosis.         Problem List/Main Diagnoses (system-based):   #     #     #    Inpatient treatment course:     Patient was discharged to: (home/[facility]/acute rehab/hospice, etc. and w/ home health/home PT/RN/home O2)    New medications:   Changes to old medications:  Medications that were stopped:    Items to follow up as outpatient:    Physical exam at the time of discharge:       LABS & STUDIES:   #Discharge: do not delete    CELI FERNANDEZ is a 78y Male with a PMH of BPH, presenting with left internal jugular vein thrombosis, sent in by his PCP for heparinization for his blood clot prior to his prostate biopsy planned for Monday (01/22). Vascular surgery consulted, no acute intervention indicated. Started on heparin GTT, switched to therapeutic lovenox. Medically optimized for DC on lovenox with plan to switch to Eliquis after the procedure on 1/22/2024.       Problem List/Main Diagnoses (system-based):   # Internal jugular vein thrombosis   Pt with L-sided swollen and tender neck, prominent jugular vein. Outpatient CT and US demonstrated blood clot in [facility]. Started on Heparin gtt for 24h, switched to lovenox 80mg BID.  - Planned prostate biopsy for 1/22/2024  - DC on lovenox 80mg BID, hold night before procedure  - Start Eliquis after procedure  - F/u with Dr. Cotton    # BPH  H/o BPH s/p TURP, pending prostate biopsy iso uptrending PSA.  - Pending biopsy 1/22/2024    Patient was discharged to: Home    New medications: Lovenox 80mg BID  Changes to old medications: None  Medications that were stopped: None    Items to follow up as outpatient: Thrombosis workup, BPH    Physical exam at the time of discharge:   Constitutional: WDWN resting comfortably in bed; NAD  Head: NC/AT; PERRL, anicteric sclera; MMM  Eyes: PERRL, EOMI, anicteric sclera  ENT: no nasal discharge; uvula midline, no oropharyngeal erythema or exudates; MMM  Neck: supple; +left sided swelling with prominent jugular, improving   Respiratory: CTA B/L;   Cardiac: +S1/S2; RRR;  Gastrointestinal: abdomen soft, NT/ND; no rebound or guarding; +BSx4  Back: spine midline, no bony tenderness or step-offs; no CVAT B/L  Extremities: WWP, no clubbing or cyanosis; trace peripheral edema BL  Vascular: 2+ radial, femoral, DP/PT pulses B/L  Dermatologic: skin warm, dry and intact; no rashes, wounds, or scars  Neurologic: AAOx3; no focal deficits  Psychiatric: affect and characteristics of appearance, verbalizations, behaviors are appropriate    LABS & STUDIES:  .  LABS:                         12.6   7.70  )-----------( 246      ( 19 Jan 2024 05:30 )             38.9     01-19    138  |  102  |  13  ----------------------------<  101<H>  4.0   |  26  |  0.95    Ca    9.0      19 Jan 2024 05:30  Phos  2.9     01-19  Mg     1.9     01-19    TPro  6.7  /  Alb  3.7  /  TBili  0.3  /  DBili  x   /  AST  13  /  ALT  9<L>  /  AlkPhos  53  01-19    PT/INR - ( 19 Jan 2024 05:30 )   PT: 11.5 sec;   INR: 1.01          PTT - ( 19 Jan 2024 05:30 )  PTT:43.7 sec  Urinalysis Basic - ( 19 Jan 2024 05:30 )    Color: x / Appearance: x / SG: x / pH: x  Gluc: 101 mg/dL / Ketone: x  / Bili: x / Urobili: x   Blood: x / Protein: x / Nitrite: x   Leuk Esterase: x / RBC: x / WBC x   Sq Epi: x / Non Sq Epi: x / Bacteria: x                RADIOLOGY, EKG & ADDITIONAL TESTS: Reviewed.

## 2024-01-19 NOTE — PROGRESS NOTE ADULT - PROBLEM SELECTOR PLAN 2
H/O of BPH, S/P TURP  MRI (7/2022) with PI-RADS 3. Per , pt had uptrending PSA. Had a recent MRI prostate done which showed possible prostate cancer with lymphadenopathy. Follow with Dr. Joel Brewer   Has a prostate biopsy planned for Monday (01/22)  Home meds  Finasteride 5 MG qd, Tadalafil 5 MG qd prn    Plan:  -C/w home meds

## 2024-02-01 DIAGNOSIS — N40.0 BENIGN PROSTATIC HYPERPLASIA WITHOUT LOWER URINARY TRACT SYMPTOMS: ICD-10-CM

## 2024-02-01 DIAGNOSIS — R59.0 LOCALIZED ENLARGED LYMPH NODES: ICD-10-CM

## 2024-02-01 DIAGNOSIS — I82.C12 ACUTE EMBOLISM AND THROMBOSIS OF LEFT INTERNAL JUGULAR VEIN: ICD-10-CM

## 2024-02-01 DIAGNOSIS — R13.10 DYSPHAGIA, UNSPECIFIED: ICD-10-CM
